# Patient Record
Sex: MALE | Race: WHITE | URBAN - METROPOLITAN AREA
[De-identification: names, ages, dates, MRNs, and addresses within clinical notes are randomized per-mention and may not be internally consistent; named-entity substitution may affect disease eponyms.]

---

## 2021-02-13 ENCOUNTER — TELEPHONE (OUTPATIENT)
Dept: MEDSURG UNIT | Facility: HOSPITAL | Age: 61
End: 2021-02-13

## 2021-02-13 RX ORDER — GABAPENTIN 300 MG/1
300 CAPSULE ORAL NIGHTLY
Qty: 14 CAPSULE | Refills: 0 | Status: SHIPPED | OUTPATIENT
Start: 2021-02-13 | End: 2021-03-17

## 2021-02-13 RX ORDER — GABAPENTIN 300 MG/1
300 CAPSULE ORAL NIGHTLY
Qty: 15 CAPSULE | Refills: 0 | Status: SHIPPED | OUTPATIENT
Start: 2021-02-13 | End: 2021-03-17

## 2021-02-13 NOTE — TELEPHONE ENCOUNTER
I met the patient in the office and changed his splint. I will refill his gabapentin & if he has difficulty - we discussed a small and short term use of xanax.

## 2021-02-13 NOTE — TELEPHONE ENCOUNTER
Karissa Perkins called complaining of pressure posterior and lateral in his splint. He had his splint replaced Thursday in [facility].     I coached him one the need to elevate & on ways to position so that he pressure is alleviated laterally and posteriorly